# Patient Record
Sex: MALE
[De-identification: names, ages, dates, MRNs, and addresses within clinical notes are randomized per-mention and may not be internally consistent; named-entity substitution may affect disease eponyms.]

---

## 2019-07-03 ENCOUNTER — APPOINTMENT (OUTPATIENT)
Dept: PHYSICAL MEDICINE AND REHAB | Facility: CLINIC | Age: 44
End: 2019-07-03
Payer: COMMERCIAL

## 2019-07-03 VITALS — HEIGHT: 69 IN | WEIGHT: 185 LBS | BODY MASS INDEX: 27.4 KG/M2

## 2019-07-03 DIAGNOSIS — M54.12 RADICULOPATHY, CERVICAL REGION: ICD-10-CM

## 2019-07-03 DIAGNOSIS — Z78.9 OTHER SPECIFIED HEALTH STATUS: ICD-10-CM

## 2019-07-03 PROCEDURE — 99204 OFFICE O/P NEW MOD 45 MIN: CPT

## 2019-07-03 PROCEDURE — 72040 X-RAY EXAM NECK SPINE 2-3 VW: CPT

## 2019-07-06 NOTE — HISTORY OF PRESENT ILLNESS
[FreeTextEntry1] : PATIENT HAS BEEN EXPERIENCING PAIN IN THE BACK OF THE NECK THAT RADIATES DOWN TO LEFT SHOULDER AND TO FOREARM. PATIENTS REPORTS TINGLING AND NUMBING. IS CURRENTLY ON PAIN MEDICATION. HAS TROUBLE GOING TO SLEEP. PAIN LEVEL 8/10.

## 2019-07-06 NOTE — PHYSICAL EXAM
[Normal] : Alert and in no acute distress [de-identified] : no gross deformity, TTP over the bilateral cervical paraspinals, ROM limited in all planes, positive Spurling's on left, negative Olivas's

## 2019-07-06 NOTE — ASSESSMENT
[FreeTextEntry1] : Patient has tried over 6 weeks of physician guided care with conservative management including NSAIDs and PT without improvement in symptoms. He is feeling worse, will get an MRI of the C spine and plan for an epidural steroid injection.

## 2023-08-02 ENCOUNTER — OUTPATIENT (OUTPATIENT)
Dept: OUTPATIENT SERVICES | Facility: HOSPITAL | Age: 48
LOS: 1 days | End: 2023-08-02
Payer: COMMERCIAL

## 2023-08-02 ENCOUNTER — APPOINTMENT (OUTPATIENT)
Dept: RADIOLOGY | Facility: HOSPITAL | Age: 48
End: 2023-08-02

## 2023-08-02 DIAGNOSIS — Z00.8 ENCOUNTER FOR OTHER GENERAL EXAMINATION: ICD-10-CM

## 2023-08-02 PROCEDURE — 73030 X-RAY EXAM OF SHOULDER: CPT | Mod: 26,LT

## 2023-08-02 PROCEDURE — 73030 X-RAY EXAM OF SHOULDER: CPT
